# Patient Record
Sex: FEMALE | Race: WHITE | Employment: STUDENT | URBAN - METROPOLITAN AREA
[De-identification: names, ages, dates, MRNs, and addresses within clinical notes are randomized per-mention and may not be internally consistent; named-entity substitution may affect disease eponyms.]

---

## 2018-07-29 ENCOUNTER — NURSE TRIAGE (OUTPATIENT)
Dept: NURSING | Facility: CLINIC | Age: 18
End: 2018-07-29

## 2018-07-30 ENCOUNTER — APPOINTMENT (OUTPATIENT)
Dept: CT IMAGING | Facility: CLINIC | Age: 18
End: 2018-07-30
Attending: EMERGENCY MEDICINE
Payer: COMMERCIAL

## 2018-07-30 ENCOUNTER — HOSPITAL ENCOUNTER (EMERGENCY)
Facility: CLINIC | Age: 18
Discharge: HOME | End: 2018-07-30
Attending: EMERGENCY MEDICINE | Admitting: EMERGENCY MEDICINE
Payer: COMMERCIAL

## 2018-07-30 VITALS
RESPIRATION RATE: 16 BRPM | HEIGHT: 65 IN | DIASTOLIC BLOOD PRESSURE: 78 MMHG | WEIGHT: 128 LBS | SYSTOLIC BLOOD PRESSURE: 113 MMHG | BODY MASS INDEX: 21.33 KG/M2 | OXYGEN SATURATION: 99 % | TEMPERATURE: 97.7 F

## 2018-07-30 DIAGNOSIS — S16.1XXA STRAIN OF NECK MUSCLE, INITIAL ENCOUNTER: ICD-10-CM

## 2018-07-30 DIAGNOSIS — S09.90XA CLOSED HEAD INJURY, INITIAL ENCOUNTER: ICD-10-CM

## 2018-07-30 LAB
HCG UR QL: NEGATIVE
INTERNAL QC OK POCT: YES

## 2018-07-30 PROCEDURE — 72125 CT NECK SPINE W/O DYE: CPT

## 2018-07-30 PROCEDURE — 81025 URINE PREGNANCY TEST: CPT | Performed by: EMERGENCY MEDICINE

## 2018-07-30 PROCEDURE — 99284 EMERGENCY DEPT VISIT MOD MDM: CPT | Mod: 25 | Performed by: EMERGENCY MEDICINE

## 2018-07-30 PROCEDURE — 70450 CT HEAD/BRAIN W/O DYE: CPT

## 2018-07-30 PROCEDURE — 99283 EMERGENCY DEPT VISIT LOW MDM: CPT | Mod: Z6 | Performed by: EMERGENCY MEDICINE

## 2018-07-30 ASSESSMENT — ENCOUNTER SYMPTOMS
NECK PAIN: 1
ABDOMINAL PAIN: 0
HEADACHES: 1
FEVER: 0
SHORTNESS OF BREATH: 0
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
## 2018-07-30 NOTE — DISCHARGE INSTRUCTIONS
After a Concussion     Awaken to check alertness as often as the health care provider suggests.     If you had a mild concussion (a head injury), watch closely for signs of problems during the first 48 hours after the injury. Follow the doctor s advice about recovering at home. Use the tips on this handout as a guide.  Note: You should not be left alone after a concussion. If no adult can stay with the injured person, let the doctor know.  Have someone call 911 or your emergency number if you can't fully wake up or have a seizures or convulsions.   The first 48 hours  Don t take medicine unless approved by your healthcare provider. Try placing a cold, damp cloth on your head to help relieve a headache.    Ask the doctor before using any medicines.    Don't drink alcohol or take sedatives or medicines that make you sleepy.    Don't return to sports or any activity that could cause you to hit your head until all symptoms are gone and you have been cleared by your doctor. A second head injury before fully recovering from the first one can lead to serious brain injury.    Don't do activities that need a lot of concentration or a lot of attention. This will allow your brain to rest and heal more quickly.    Return to regular physical and mental activity as directed and approved by your healthcare provider.  Tips about sleeping  For the first day or two, it may be best not to sleep for long periods of time without being checked for alertness. Follow the doctor s instructions.  ? Have someone wake you every ____ hours for the next ____ hours. He or she should ask you questions to check for alertness.  ? OK to sleep through the night.     When to call the healthcare provider  If you notice any of the following, call the healthcare provider:    Vomiting. Some vomiting is common, but tell the provider about any vomiting.    Clear or bloody drainage from the nose or ear    Constant drowsiness or difficulty in waking  up    Confusion or memory loss    Blurred vision or any vision changes    Inability to walk or talk normally    Increased weakness or problems with coordination    Constant, unrelieved headache that becomes more severe    Changes in behavior or personality    High-pitched crying in infants    Signs of stroke such as paralysis of parts of the body    Uncrontrolled movements suggesting a seizure   Date Last Reviewed: 12/1/2017 2000-2017 [employer]. [address] [place] [address]. All rights reserved. This information is not intended as a substitute for professional medical care. Always follow your healthcare professional's instructions.          First Aid: Head Injuries  A strong blow to the head may cause swelling and bleeding inside the skull. The resulting pressure can injure the brain (concussion). If you have any doubts about a concussion, have a healthcare provider check the victim.  When to call 911  Call 911 right away if any of the following is true:    The victim loses consciousness or is lethargic.    The victim has convulsions or seizures.    The victim has unequal pupil size. The pupil is the black part in the center of the eye.    The victim shows any of the following signs of concussion:  ? Confusion or inability to follow normal conversation  ? Slurred speech  ? Dizziness or vision problems  ? Nausea or vomiting  ? Muscle weakness or loss of mobility  ? Memory loss  ? Sensitivity to noise    A depressed or spongy area in the skull, or visible bone fragments    Clear fluid draining out of  the ears or nose    Bruising behind the ears or around the eyes  While you wait for help:    Reassure the person.    Treat for shock by maintaining body temperature and keeping the victim calm.    Do rescue breathing or CPR, if needed.  If the person has neck or back pain or is unconscious, he or she might have a spine fracture. Move the person only with great caution and only if  absolutely needed.   Step 1. Control bleeding    Apply direct pressure to control bleeding. Wear gloves or use other protection to avoid contact with victim's blood.    Wash a minor surface injury with soap and water after the bleeding stops or is reduced.    Cover the wound with a clean dressing and bandage.  Step 2. Ice bumps and bruises    Place a cold pack or ice on the injury to reduce swelling and pain. Placing a cloth between the skin and the ice pack helps prevent tissue damage from severe cold.  Step 3. Observe the victim    Watch for vomiting or changes in mood or alertness. If you notice changes, call for medical help. Signs of concussion may not appear for up to 48 hours.    Tell the person's partner, parent, or roommate about the injury so he or she can continue to observe the victim.  Stitches  If a cut is deep or continues to bleed, or the edges of skin don't stay together evenly, the wound may need to be closed with stitches, tape, staples, or medical glue. Any of these can help speed healing and reduce the risk for infection and the size of the scar. These may be especially important concerns with large wounds, and wounds on the head or other visible body parts.  If you think a wound may need medical care, see a healthcare provider as soon as possible. If you need stitches, they must be done in the first few hours. A wound that is not properly closed is at risk for serious infection.  Date Last Reviewed: 12/1/2017 2000-2017 [employer]. [address] [place] [address], [place] [id number]. All rights reserved. This information is not intended as a substitute for professional medical care. Always follow your healthcare professional's instructions.      Make sure to follow-up with your primary care doctor in 2 days to make sure that you are did getting better and to see if any other test treatments will be needed if fevers nausea vomiting worse pain or if any concerns develop please return to my  department as was possible

## 2018-07-30 NOTE — ED AVS SNAPSHOT
[facility], Emergency Department    [address] [facility] [address]    Phone:  [phone]                                       Emmanuelle Mejia   MRN: [id number]    Department:  [facility], Emergency Department   Date of Visit:  7/30/2018           Patient Information     Date Of Birth          2000        Your diagnoses for this visit were:     Closed head injury, initial encounter     Strain of neck muscle, initial encounter        You were seen by Jeremy Ochoa MD.      Follow-up Information     Follow up with [facility] In 2 days.    Specialties:  Family Medicine, Internal Medicine, Pediatrics    Why:  To recheck you.    Contact information:    [address] [facility] [address] [phone]        Discharge Instructions         After a Concussion     Awaken to check alertness as often as the health care provider suggests.     If you had a mild concussion (a head injury), watch closely for signs of problems during the first 48 hours after the injury. Follow the doctor s advice about recovering at home. Use the tips on this handout as a guide.  Note: You should not be left alone after a concussion. If no adult can stay with the injured person, let the doctor know.  Have someone call 911 or your emergency number if you can't fully wake up or have a seizures or convulsions.   The first 48 hours  Don t take medicine unless approved by your healthcare provider. Try placing a cold, damp cloth on your head to help relieve a headache.    Ask the doctor before using any medicines.    Don't drink alcohol or take sedatives or medicines that make you sleepy.    Don't return to sports or any activity that could cause you to hit your head until all symptoms are gone and you have been cleared by your doctor. A second head injury before fully recovering from the first one can lead to serious brain injury.    Don't do activities that need a lot of concentration or a  lot of attention. This will allow your brain to rest and heal more quickly.    Return to regular physical and mental activity as directed and approved by your healthcare provider.  Tips about sleeping  For the first day or two, it may be best not to sleep for long periods of time without being checked for alertness. Follow the doctor s instructions.  ? Have someone wake you every ____ hours for the next ____ hours. He or she should ask you questions to check for alertness.  ? OK to sleep through the night.     When to call the healthcare provider  If you notice any of the following, call the healthcare provider:    Vomiting. Some vomiting is common, but tell the provider about any vomiting.    Clear or bloody drainage from the nose or ear    Constant drowsiness or difficulty in waking up    Confusion or memory loss    Blurred vision or any vision changes    Inability to walk or talk normally    Increased weakness or problems with coordination    Constant, unrelieved headache that becomes more severe    Changes in behavior or personality    High-pitched crying in infants    Signs of stroke such as paralysis of parts of the body    Uncrontrolled movements suggesting a seizure   Date Last Reviewed: 12/1/2017 2000-2017 [employer]. [address] [place] [address]. All rights reserved. This information is not intended as a substitute for professional medical care. Always follow your healthcare professional's instructions.          First Aid: Head Injuries  A strong blow to the head may cause swelling and bleeding inside the skull. The resulting pressure can injure the brain (concussion). If you have any doubts about a concussion, have a healthcare provider check the victim.  When to call 911  Call 911 right away if any of the following is true:    The victim loses consciousness or is lethargic.    The victim has convulsions or seizures.    The victim has unequal pupil size. The pupil is the  black part in the center of the eye.    The victim shows any of the following signs of concussion:  ? Confusion or inability to follow normal conversation  ? Slurred speech  ? Dizziness or vision problems  ? Nausea or vomiting  ? Muscle weakness or loss of mobility  ? Memory loss  ? Sensitivity to noise    A depressed or spongy area in the skull, or visible bone fragments    Clear fluid draining out of  the ears or nose    Bruising behind the ears or around the eyes  While you wait for help:    Reassure the person.    Treat for shock by maintaining body temperature and keeping the victim calm.    Do rescue breathing or CPR, if needed.  If the person has neck or back pain or is unconscious, he or she might have a spine fracture. Move the person only with great caution and only if absolutely needed.   Step 1. Control bleeding    Apply direct pressure to control bleeding. Wear gloves or use other protection to avoid contact with victim's blood.    Wash a minor surface injury with soap and water after the bleeding stops or is reduced.    Cover the wound with a clean dressing and bandage.  Step 2. Ice bumps and bruises    Place a cold pack or ice on the injury to reduce swelling and pain. Placing a cloth between the skin and the ice pack helps prevent tissue damage from severe cold.  Step 3. Observe the victim    Watch for vomiting or changes in mood or alertness. If you notice changes, call for medical help. Signs of concussion may not appear for up to 48 hours.    Tell the person's partner, parent, or roommate about the injury so he or she can continue to observe the victim.  Stitches  If a cut is deep or continues to bleed, or the edges of skin don't stay together evenly, the wound may need to be closed with stitches, tape, staples, or medical glue. Any of these can help speed healing and reduce the risk for infection and the size of the scar. These may be especially important concerns with large wounds, and wounds on  the head or other visible body parts.  If you think a wound may need medical care, see a healthcare provider as soon as possible. If you need stitches, they must be done in the first few hours. A wound that is not properly closed is at risk for serious infection.  Date Last Reviewed: 12/1/2017 2000-2017 [employer]. [address] [place] [address], [place] [address]. All rights reserved. This information is not intended as a substitute for professional medical care. Always follow your healthcare professional's instructions.      Make sure to follow-up with your primary care doctor in 2 days to make sure that you are did getting better and to see if any other test treatments will be needed if fevers nausea vomiting worse pain or if any concerns develop please return to my department as was possible    24 Hour Appointment Hotline       To make an appointment at any [facility] clinic, call [phone] ([phone]). If you don't have a family doctor or clinic, we will help you find one. [facility] clinics are conveniently located to serve the needs of you and your family.             Review of your medicines      Notice     You have not been prescribed any medications.            Procedures and tests performed during your visit     hCG qual urine POCT      Orders Needing Specimen Collection     None      Pending Results     No orders found from 7/28/2018 to 7/31/2018.            Pending Culture Results     No orders found from 7/28/2018 to 7/31/2018.            Pending Results Instructions     If you had any lab results that were not finalized at the time of your Discharge, you can call the ED Lab Result RN at [phone]. You will be contacted by this team for any positive Lab results or changes in treatment. The nurses are available 7 days a week from 10A to 6:30P.  You can leave a message 24 hours per day and they will return your call.        Thank you for choosing [facility]       Thank you for choosing  "[facility] for your care. Our goal is always to provide you with excellent care. Hearing back from our patients is one way we can continue to improve our services. Please take a few minutes to complete the written survey that you may receive in the mail after you visit with us. Thank you!        [employer] Information     [employer] lets you send messages to your doctor, view your test results, renew your prescriptions, schedule appointments and more. To sign up, go to [email or web address] . Click on \"Log in\" on the left side of the screen, which will take you to the Welcome page. Then click on \"Sign up Now\" on the right side of the page.     You will be asked to enter the access code listed below, as well as some personal information. Please follow the directions to create your username and password.     Your access code is: [email or web address]  Expires: 10/28/2018  2:05 AM     Your access code will  in 90 days. If you need help or a new code, please call your [facility] clinic or [phone].        Care EveryWhere ID     This is your Care EveryWhere ID. This could be used by other organizations to access your [facility] medical records  [id number]        Equal Access to Services     FERNANDO BLAND : Nikki Chaudhari, simón zamora, qaaurea kaaljose grijalva, murali garcia. So [facility] [place] [phone].    ATENCIÓN: Si habla español, tiene a garcia disposición servicios gratuitos de asistencia lingüística. Llame al [phone].    We comply with applicable federal civil rights laws and [place] laws. We do not discriminate on the basis of race, color, national origin, age, disability, sex, sexual orientation, or gender identity.            After Visit Summary       This is your record. Keep this with you and show to your community pharmacist(s) and doctor(s) at your next visit.                  "
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
## 2018-10-29 ENCOUNTER — NURSE TRIAGE (OUTPATIENT)
Dept: NURSING | Facility: CLINIC | Age: 18
End: 2018-10-29

## 2018-10-30 NOTE — TELEPHONE ENCOUNTER
Reason for Disposition    [1] MILD abdominal pain (e.g., does not interfere with normal activities) AND [2] pain comes and goes (cramps) AND [3] present > 48 hours    Additional Information    Negative: Shock suspected (e.g., cold/pale/clammy skin, too weak to stand, low BP, rapid pulse)    Negative: Sounds like a life-threatening emergency to the triager    Negative: [1] Abdominal pain AND [2] pregnant < 20 weeks    Negative: [1] Vaginal bleeding AND [2] pregnant < 20 weeks    Negative: Vaginal discharge is main symptom    Negative: [1] SEVERE abdominal pain (e.g., excruciating) AND [2] present > 1 hour    Negative: SEVERE vaginal bleeding (i.e., soaking 2 pads or tampons per hour and present 2 or more hours)    Negative: Patient sounds very sick or weak to the triager    Negative: MODERATE vaginal bleeding (i.e., soaking 1 pad or tampon per hour and present > 6 hours)    Negative: [1] Constant abdominal pain AND [2] present > 2 hours    Negative: Caller has URGENT question and triager unable to answer question    Protocols used: CONTRACEPTION - IUD SYMPTOMS AND QUESTIONS-ADULT-AH  Patient called stating that she has had a low grade lower abdominal pain since 7am today.  She is concerned that her IUD may have moved.  She can only feel one string.  She will go into quick care in the morning.

## 2019-01-30 ENCOUNTER — HOSPITAL ENCOUNTER (EMERGENCY)
Facility: CLINIC | Age: 19
Discharge: HOME | End: 2019-01-30
Attending: EMERGENCY MEDICINE | Admitting: EMERGENCY MEDICINE
Payer: COMMERCIAL

## 2019-01-30 VITALS
SYSTOLIC BLOOD PRESSURE: 118 MMHG | OXYGEN SATURATION: 99 % | WEIGHT: 124 LBS | HEART RATE: 66 BPM | DIASTOLIC BLOOD PRESSURE: 72 MMHG | HEIGHT: 65 IN | RESPIRATION RATE: 17 BRPM | BODY MASS INDEX: 20.66 KG/M2 | TEMPERATURE: 98 F

## 2019-01-30 DIAGNOSIS — R22.9 SUBCUTANEOUS NODULE: ICD-10-CM

## 2019-01-30 DIAGNOSIS — Z71.1 CONCERN ABOUT STD IN FEMALE WITHOUT DIAGNOSIS: ICD-10-CM

## 2019-01-30 LAB
ALBUMIN UR-MCNC: NEGATIVE MG/DL
APPEARANCE UR: CLEAR
BILIRUB UR QL STRIP: NEGATIVE
COLOR UR AUTO: NORMAL
GLUCOSE UR STRIP-MCNC: NEGATIVE MG/DL
HCG UR QL: NEGATIVE
HGB UR QL STRIP: NEGATIVE
KETONES UR STRIP-MCNC: NEGATIVE MG/DL
LEUKOCYTE ESTERASE UR QL STRIP: NEGATIVE
NITRATE UR QL: NEGATIVE
PH UR STRIP: 6.5 PH (ref 5–7)
SOURCE: NORMAL
SP GR UR STRIP: 1.01 (ref 1–1.03)
SPECIMEN SOURCE: NORMAL
T PALLIDUM AB SER QL: NONREACTIVE
UROBILINOGEN UR STRIP-MCNC: NORMAL MG/DL (ref 0–2)
WET PREP SPEC: NORMAL

## 2019-01-30 PROCEDURE — 87591 N.GONORRHOEAE DNA AMP PROB: CPT | Performed by: EMERGENCY MEDICINE

## 2019-01-30 PROCEDURE — 99282 EMERGENCY DEPT VISIT SF MDM: CPT | Mod: Z6 | Performed by: EMERGENCY MEDICINE

## 2019-01-30 PROCEDURE — 81025 URINE PREGNANCY TEST: CPT | Performed by: EMERGENCY MEDICINE

## 2019-01-30 PROCEDURE — 99284 EMERGENCY DEPT VISIT MOD MDM: CPT | Performed by: EMERGENCY MEDICINE

## 2019-01-30 PROCEDURE — 81003 URINALYSIS AUTO W/O SCOPE: CPT | Performed by: EMERGENCY MEDICINE

## 2019-01-30 PROCEDURE — 86780 TREPONEMA PALLIDUM: CPT | Performed by: EMERGENCY MEDICINE

## 2019-01-30 PROCEDURE — 87491 CHLMYD TRACH DNA AMP PROBE: CPT | Performed by: EMERGENCY MEDICINE

## 2019-01-30 PROCEDURE — 87210 SMEAR WET MOUNT SALINE/INK: CPT | Performed by: EMERGENCY MEDICINE

## 2019-01-30 PROCEDURE — 36415 COLL VENOUS BLD VENIPUNCTURE: CPT | Performed by: EMERGENCY MEDICINE

## 2019-01-30 ASSESSMENT — ENCOUNTER SYMPTOMS
FEVER: 0
DIFFICULTY URINATING: 0
ABDOMINAL PAIN: 0
NECK STIFFNESS: 0
FREQUENCY: 0
HEADACHES: 0
COLOR CHANGE: 0
ARTHRALGIAS: 0
CONFUSION: 0
CHILLS: 0
EYE REDNESS: 0
SHORTNESS OF BREATH: 0

## 2019-01-30 ASSESSMENT — MIFFLIN-ST. JEOR: SCORE: 1333.34

## 2019-01-30 NOTE — ED PROVIDER NOTES
History     Chief Complaint   Patient presents with     Exposure to STD     The history is provided by the patient and medical records.     Emmanuelle Mejia is a 18 year old otherwise healthy female who presents to the Emergency Department for evaluation of possible exposure to syphilis. Patient reports that after browsing on the [facility] web site [employer] and noticing that she has similar symptoms for syphilis, such as round bump in vaginal area and cramping pain, decided to visit the ED. She reports that she began to develop vaginal pain in December. The pain was very pronounced on January 20th after having sexual intercourse, at which time she assumed was just cramps, which subsided after 48 hours. Around mid January, she started to notice a round bump in her vaginal area. Currently, she complains of pain when urinating and suspect muscle contractions. She denies a fever; however, feels that she may have had chills. She denies urgency or frequency. She states that her last menses was approximately 3 weeks ago and might possibly be mensing right now. She menses every 4 weeks. She reports having an IUD.     Past Medical History:   Diagnosis Date     Collar bone fracture        History reviewed. No pertinent surgical history.    History reviewed. No pertinent family history.    Social History     Tobacco Use     Smoking status: Never Smoker     Smokeless tobacco: Never Used   Substance Use Topics     Alcohol use: Yes     Comment: a couple drinks once a month       No current facility-administered medications for this encounter.      Current Outpatient Medications   Medication     levonorgestrel (MIRENA) 20 MCG/24HR IUD        Allergies   Allergen Reactions     Penicillins        I have reviewed the Medications, Allergies, Past Medical and Surgical History, and Social History in the Epic system.    Review of Systems   Constitutional: Negative for chills and fever.   HENT: Negative for congestion.    Eyes: Negative for redness.  "  Respiratory: Negative for shortness of breath.    Cardiovascular: Negative for chest pain.   Gastrointestinal: Negative for abdominal pain.   Genitourinary: Negative for difficulty urinating, frequency and urgency.        Positive for cramping pain  Positive for pain when urinating   Musculoskeletal: Negative for arthralgias and neck stiffness.   Skin: Negative for color change.        Positive for round bump in vaginal area   Neurological: Negative for headaches.   Psychiatric/Behavioral: Negative for confusion.   All other systems reviewed and are negative.      Physical Exam   BP: 141/72  Heart Rate: 93  Temp: 98  F (36.7  C)  Resp: 17  Height: 165.1 cm (5' 5\")  Weight: 56.2 kg (124 lb)  SpO2: 100 %      Physical Exam   Constitutional: No distress.   HENT:   Head: Atraumatic.   Mouth/Throat: Oropharynx is clear and moist. No oropharyngeal exudate.   Eyes: Pupils are equal, round, and reactive to light. No scleral icterus.   Cardiovascular: Normal heart sounds and intact distal pulses.   Pulmonary/Chest: Breath sounds normal. No respiratory distress.   Abdominal: Soft. Bowel sounds are normal. There is no tenderness.   Genitourinary: Vagina normal.       Uterus is not deviated, not enlarged, not fixed and not tender. Cervix exhibits no motion tenderness, no discharge and no friability. Right adnexum displays no mass, no tenderness and no fullness. Left adnexum displays no mass, no tenderness and no fullness.   Musculoskeletal: She exhibits no edema or tenderness.   Skin: Skin is warm. No rash noted. She is not diaphoretic.       ED Course   12:01 PM  The patient was seen and examined by Carmelo Canela MD in [facility]        Procedures        Results for orders placed or performed during the hospital encounter of 01/30/19   UA reflex to Microscopic and Culture   Result Value Ref Range    Color Urine Light Yellow     Appearance Urine Clear     Glucose Urine Negative NEG^Negative mg/dL    Bilirubin Urine Negative " NEG^Negative    Ketones Urine Negative NEG^Negative mg/dL    Specific Gravity Urine 1.011 1.003 - 1.035    Blood Urine Negative NEG^Negative    pH Urine 6.5 5.0 - 7.0 pH    Protein Albumin Urine Negative NEG^Negative mg/dL    Urobilinogen mg/dL Normal 0.0 - 2.0 mg/dL    Nitrite Urine Negative NEG^Negative    Leukocyte Esterase Urine Negative NEG^Negative    Source Midstream Urine    HCG qualitative urine (UPT)   Result Value Ref Range    HCG Qual Urine Negative NEG^Negative   Wet prep   Result Value Ref Range    Specimen Description Swab VAGINAL     Wet Prep No motile Trichomonas seen     Wet Prep No yeast seen     Wet Prep PMNs seen  Moderate       Wet Prep No clue cells seen             Labs Ordered and Resulted from Time of ED Arrival Up to the Time of Departure from the ED   UA MACROSCOPIC WITH REFLEX TO MICRO AND CULTURE   HCG QUALITATIVE URINE   TREPONEMA ABS W REFLEX TO RPR AND TITER   PREP FOR PROCEDURE   NO   WET PREP   CHLAMYDIA TRACHOMATIS PCR   NEISSERIA GONORRHOEAE PCR            Assessments & Plan (with Medical Decision Making)   18-year-old female who presents with concerns regarding STD specifically syphilis.  Patient felt she had a chancre, however, exam revealed a less than 1 cm subcutaneous cyst in her right groin consistent with a sebaceous cyst or lipoma.  STD testing occurred and is pending.  If positive, patient will be contacted.  She was encouraged and given information about safe sex.    I have reviewed the nursing notes.    I have reviewed the findings, diagnosis, plan and need for follow up with the patient.       Medication List      There are no discharge medications for this visit.         Final diagnoses:   Concern about STD in female without diagnosis   Subcutaneous nodule     Germaine BHANDARI, am serving as a trained medical scribe to document services personally performed by Carmelo Canela MD, based on the provider's statements to me.      Carmelo BHANDARI MD, was physically present and  have reviewed and verified the accuracy of this note documented by Germaine Whyte.     1/30/2019   [facility], [place], EMERGENCY DEPARTMENT     Tomas Canela MD  01/30/19 5710

## 2019-01-30 NOTE — ED TRIAGE NOTES
"Presents with painless bump under skin near vagina that patient noticed over Padma break. Patient also c/o \"abnormal vaginal discharge\". Patient also c/o very \"faint\" pain in vagina for a couple days. Patient states that she has 1 sexual partner for over 1 year.     Also of note, patient had intercourse on January 20, 2019 and states it was very painful afterwards for about a half an hour. Patient states she feels that same pain has come back but less intense.  "

## 2019-01-30 NOTE — ED AVS SNAPSHOT
[facility], [place], Emergency Department  [address] [address]  Phone:  [phone]                                    Emmanuelle Mejia   MRN: [id number]    Department:  [facility], Emergency Department   Date of Visit:  1/30/2019           After Visit Summary Signature Page    I have received my discharge instructions, and my questions have been answered. I have discussed any challenges I see with this plan with the nurse or doctor.    ..........................................................................................................................................  Patient/Patient Representative Signature      ..........................................................................................................................................  Patient Representative Print Name and Relationship to Patient    ..................................................               ................................................  Date                                   Time    ..........................................................................................................................................  Reviewed by Signature/Title    ...................................................              ..............................................  Date                                               Time          22EPIC Rev 08/18

## 2019-01-31 LAB
C TRACH DNA SPEC QL NAA+PROBE: NEGATIVE
N GONORRHOEA DNA SPEC QL NAA+PROBE: NEGATIVE
SPECIMEN SOURCE: NORMAL
SPECIMEN SOURCE: NORMAL

## 2019-01-31 NOTE — RESULT ENCOUNTER NOTE
Final result for both N. Gonorrhoeae PCR and Chlamydia Trachomatis PCR are NEGATIVE.  No treatment or change in treatment per [place] ED Lab Result protocol.

## 2019-05-16 ENCOUNTER — HEALTH MAINTENANCE LETTER (OUTPATIENT)
Age: 19
End: 2019-05-16

## 2020-03-11 ENCOUNTER — HEALTH MAINTENANCE LETTER (OUTPATIENT)
Age: 20
End: 2020-03-11

## 2021-01-03 ENCOUNTER — HEALTH MAINTENANCE LETTER (OUTPATIENT)
Age: 21
End: 2021-01-03

## 2021-04-25 ENCOUNTER — HEALTH MAINTENANCE LETTER (OUTPATIENT)
Age: 21
End: 2021-04-25

## 2021-10-10 ENCOUNTER — HEALTH MAINTENANCE LETTER (OUTPATIENT)
Age: 21
End: 2021-10-10

## 2022-05-21 ENCOUNTER — HEALTH MAINTENANCE LETTER (OUTPATIENT)
Age: 22
End: 2022-05-21

## 2022-09-18 ENCOUNTER — HEALTH MAINTENANCE LETTER (OUTPATIENT)
Age: 22
End: 2022-09-18

## 2023-06-04 ENCOUNTER — HEALTH MAINTENANCE LETTER (OUTPATIENT)
Age: 23
End: 2023-06-04